# Patient Record
Sex: MALE | Race: BLACK OR AFRICAN AMERICAN | NOT HISPANIC OR LATINO | Employment: FULL TIME | URBAN - METROPOLITAN AREA
[De-identification: names, ages, dates, MRNs, and addresses within clinical notes are randomized per-mention and may not be internally consistent; named-entity substitution may affect disease eponyms.]

---

## 2021-08-27 ENCOUNTER — IMMUNIZATION (OUTPATIENT)
Dept: FAMILY MEDICINE CLINIC | Facility: CLINIC | Age: 31
End: 2021-08-27

## 2021-08-27 DIAGNOSIS — Z23 IMMUNIZATION DUE: Primary | ICD-10-CM

## 2021-08-27 PROCEDURE — 0001A COVID-19 (PFIZER): CPT | Performed by: FAMILY MEDICINE

## 2021-08-27 PROCEDURE — 91300 COVID-19 (PFIZER): CPT | Performed by: FAMILY MEDICINE

## 2021-09-17 ENCOUNTER — IMMUNIZATION (OUTPATIENT)
Dept: FAMILY MEDICINE CLINIC | Facility: CLINIC | Age: 31
End: 2021-09-17

## 2021-09-17 PROCEDURE — 0002A COVID-19 (PFIZER): CPT | Performed by: FAMILY MEDICINE

## 2021-09-17 PROCEDURE — 91300 COVID-19 (PFIZER): CPT | Performed by: FAMILY MEDICINE

## 2023-05-12 ENCOUNTER — PATIENT ROUNDING (BHMG ONLY) (OUTPATIENT)
Dept: FAMILY MEDICINE CLINIC | Facility: CLINIC | Age: 33
End: 2023-05-12
Payer: COMMERCIAL

## 2023-05-12 ENCOUNTER — OFFICE VISIT (OUTPATIENT)
Dept: FAMILY MEDICINE CLINIC | Facility: CLINIC | Age: 33
End: 2023-05-12
Payer: COMMERCIAL

## 2023-05-12 VITALS
HEIGHT: 70 IN | DIASTOLIC BLOOD PRESSURE: 60 MMHG | SYSTOLIC BLOOD PRESSURE: 122 MMHG | BODY MASS INDEX: 21.1 KG/M2 | WEIGHT: 147.4 LBS | HEART RATE: 73 BPM | TEMPERATURE: 97.8 F | OXYGEN SATURATION: 99 %

## 2023-05-12 DIAGNOSIS — Z12.5 PROSTATE CANCER SCREENING: ICD-10-CM

## 2023-05-12 DIAGNOSIS — Z00.00 GENERAL MEDICAL EXAM: Primary | ICD-10-CM

## 2023-05-12 NOTE — PROGRESS NOTES
Subjective   Nadeem Ayers is a 33 y.o. male  Establish Care (New patient establish care, previous pcp Pediatrician in [place] Dr. Ward ) and Annual Exam (Annual physical and labs )      History of Present Illness   The patient is a 33-year-old male seen today to establish care as a new patient in our practice and for an annual physical.    The patient reports that he has not had a full checkup since he was approximately 21 years old. In general, he is healthy, has not had to go to urgent care often, and was not overly ill as a child. He is feeling fine and presents today for preventative care. The patient denies a family history of colon cancer, rectal cancer, or prostate cancer. He denies any known exposure to hepatitis C or blood transfusions, or exposure to any untreated water.  The patient states he had a TB skin test in 2022 but is unsure when his last tetanus vaccine was. He denies any trouble with breathing or asthma when exercising. The patient reports that he works out 6 to 7 days per week by performing [employer]. He denies having any heartburn, digestive issues, or bowel issues. He denies any history of kidney stones. The patient reports that he has been increasing his water intake, 1 gallon daily. He states that he drinks 1 cup of coffee daily. The patient denies any eye issues.    The patient's father passed away from esophageal cancer in 2016.    The patient works patient registration in the hospital.    Patient presents today to establish care as a new patient in our practice and for a preventive medical visit.  Patient is here to determine screening labs and tests that are due and to determine immunization status as well.  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  He exercises regularly.  Patient will be counseled regarding preventative medicine issues such as regular exercise and healthy  diet as well.    The following portions of the patient's history were reviewed and updated as appropriate: allergies, current medications, past social history and problem list    Review of Systems   Constitutional: Negative.    HENT: Negative.    Eyes: Negative.    Respiratory: Negative.    Cardiovascular: Negative.    Gastrointestinal: Negative.    Endocrine: Negative.    Genitourinary: Negative.    Musculoskeletal: Negative.    Skin: Negative.    Allergic/Immunologic: Negative.    Neurological: Negative.    Hematological: Negative.    Psychiatric/Behavioral: Negative.    All other systems reviewed and are negative.      Objective     Vitals:    05/12/23 1310   BP: 122/60   Pulse: 73   Temp: 97.8 °F (36.6 °C)   SpO2: 99%       Physical Exam  Vitals and nursing note reviewed.   Constitutional:       General: He is not in acute distress.     Appearance: Normal appearance. He is well-developed and normal weight. He is not ill-appearing, toxic-appearing or diaphoretic.   HENT:      Head: Normocephalic and atraumatic.      Right Ear: External ear normal.      Left Ear: External ear normal.   Eyes:      Conjunctiva/sclera: Conjunctivae normal.      Pupils: Pupils are equal, round, and reactive to light.   Neck:      Thyroid: No thyromegaly.      Vascular: No carotid bruit.   Cardiovascular:      Rate and Rhythm: Normal rate and regular rhythm.      Pulses: Normal pulses.      Heart sounds: Normal heart sounds. No murmur heard.  Pulmonary:      Effort: Pulmonary effort is normal. No respiratory distress.      Breath sounds: Normal breath sounds.   Abdominal:      General: Bowel sounds are normal.      Palpations: Abdomen is soft. There is no mass.      Tenderness: There is no abdominal tenderness.   Musculoskeletal:         General: No swelling. Normal range of motion.      Cervical back: Normal range of motion and neck supple.   Lymphadenopathy:      Cervical: No cervical adenopathy.   Skin:     General: Skin is warm and  dry.      Findings: No lesion or rash.   Neurological:      Mental Status: He is alert and oriented to person, place, and time.      Cranial Nerves: No cranial nerve deficit.      Sensory: No sensory deficit.      Motor: No weakness.      Coordination: Coordination normal.      Gait: Gait normal.      Deep Tendon Reflexes: Reflexes are normal and symmetric.   Psychiatric:         Mood and Affect: Mood normal.         Behavior: Behavior normal.         Thought Content: Thought content normal.         Judgment: Judgment normal.       Discussed preventative medicine issues with patient including regular exercise, healthy diet, stress reduction, adequate sleep and recommended age-appropriate screening studies.  Assessment & Plan     Diagnoses and all orders for this visit:    1. General medical exam (Primary)  -     Lipid Panel; Future  -     Comprehensive metabolic panel; Future  -     CBC (No Diff); Future  -     TSH; Future  -     Hepatitis C Antibody; Future  -     PSA Screen; Future    2. Prostate cancer screening  -     PSA Screen; Future       Routine laboratory tests were ordered today. The patient was encouraged to cut back on the sugar in his coffee and drink more water.    Transcribed from ambient dictation for Farrah Whitehead PA-C by Bonita Arteaga.  05/12/23   16:44 EDT    Patient or patient representative verbalized consent to the visit recording.  I have personally performed the services described in this document as transcribed by the above individual, and it is both accurate and complete.  Farrah Whitehead PA-C  5/12/2023  16:48 EDT

## 2023-05-12 NOTE — PROGRESS NOTES
[facility] [place] to the patient for PATIENT ROUNDING with a My chart message